# Patient Record
Sex: MALE | Race: WHITE | Employment: STUDENT | URBAN - METROPOLITAN AREA
[De-identification: names, ages, dates, MRNs, and addresses within clinical notes are randomized per-mention and may not be internally consistent; named-entity substitution may affect disease eponyms.]

---
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
## 2022-10-31 NOTE — LETTER
[facility] [employer] of Child Health Examination       Student's Name  Jaylin De Leon Title    DO                       Date  6/25/2020   Signature Grade Level/ID#  12th Grade   HEALTH HISTORY          TO BE COMPLETED AND SIGNED BY PARENT/GUARDIAN AND VERIFIED BY HEALTH CARE PROVIDER    ALLERGIES  (Food, drug, insect, other)  Patient has no known allergies.  MEDICATION  (List all prescribed or taken o PHYSICAL EXAMINATION REQUIREMENTS (head circumference if <33 years old):   /71   Pulse 66   Ht 5' 8.11\" (1.73 m)   Wt 76.9 kg (169 lb 9.6 oz)   BMI 25.70 kg/m²     DIABETES SCREENING  BMI>85% age/sex  No And any two of the following:  Family Histor Respiratory Yes                   Diagnosis of Asthma: No Mental Health Yes        Currently Prescribed Asthma Medication:            Quick-relief  medication (e.g. Short Acting Beta Antagonist): No          Controller medication (e.g. inhaled corticostero Yes

## (undated) NOTE — Clinical Note
[facility] [address] of Child Health Examination       Student's Name  Ashley Brandy Hartman Title                           Date    (If adding dates to the above immunization history section, put your initials by date(s) and sign here.)   ALTERNATIVE PROOF OF IMMUNITY   1 Diagnosis of asthma? Child wakes during the night coughing   Yes   No    Yes   No    Loss of function of one of paired organs? (eye/ear/kidney/testicle)   Yes   No      Birth Defects? Developmental delay? Yes   No    Yes   No  Hospitalizations? When? Signs of Insulin Resistance (hypertension, dyslipidemia, polycystic ovarian syndrome, acanthosis nigricans)             No              At Risk      No   Lead Risk Questionnaire  Req'd for children 6 months thru 6 yrs enrolled in licensed or public Needs/Restrictions     None   SPECIAL INSTRUCTIONS/DEVICES e.g. safety glasses, glass eye, chest protector for arrhythmia, pacemaker, prosthetic device, dental bridge, false teeth, athleticsupport/cup     None   MENTAL HEALTH/OTHER   Is there anything else

## (undated) NOTE — LETTER
Name:  Tyson Mujica Year:  10th Grade Class: Student ID No.:   Address:  [address] [place] [address],[address] [address] Phone:  [phone] (home) [phone] (work) :  15year old   Name Relationship [facility] [address] Phone Mobile Ph implanted defibrillator? 12. Has anyone in your family had unexplained fainting, seizures, or near drowning?      BONE AND JOINT QUESTIONS Yes No   17. Have you ever had an injury to a bone, muscle, ligament, or tendon that caused you to miss a practice 39.Have you ever been unable to move your arms / legs after being hit /fall? 40. Have you ever become ill while exercising in the heat?     41. Do you get frequent muscle cramps when exercising? 42.  Do you or someone in your family have sickle cell · Murmurs (auscultation standing, supine, +/- Valsalva)  · Location of point of maximal impulse (PMI) Yes    Pulses Yes    Lungs Yes    Abdomen Yes    Genitourinary (males only)* Yes    Skin:  HSV, lesions suggestive of MRSA, tinea corporis Yes    Neurolog may be asked to submit to testing for the presence of performance-enhancing substances in my/his/her body either during [employer] state series events or during the school day, and I/our student do/does hereby agree to submit to such testing and analysis by a ce

## (undated) NOTE — LETTER
VACCINE ADMINISTRATION RECORD  PARENT / GUARDIAN APPROVAL  Date: 2020  Vaccine administered to: Vero Nicole     : 2000    MRN: [id number]    A copy of the appropriate Centers for Disease Control and Prevention Vaccine Information statem

## (undated) NOTE — Clinical Note
Name:  Leana Pulido Year:  9th Grade Class: Student ID No.:   Address:  [address] [place] [address],[address] [place] [address] Phone:  [phone] (home) [phone] (work) :  17year old   Name Relationship [employer] [address] [employer] implanted defibrillator? 12. Has anyone in your family had unexplained fainting, seizures, or near drowning?      BONE AND JOINT QUESTIONS Yes No   17. Have you ever had an injury to a bone, muscle, ligament, or tendon that caused you to miss a practice 39.Have you ever been unable to move your arms / legs after being hit /fall? 40. Have you ever become ill while exercising in the heat?     41. Do you get frequent muscle cramps when exercising? 42.  Do you or someone in your family have sickle cell · Location of point of maximal impulse (PMI) Yes    Pulses Yes    Lungs Yes    Abdomen Yes    Genitourinary (males only)* Yes    Skin:  HSV, lesions suggestive of MRSA, tinea corporis Yes    Neurologic* Yes    MUSCULOSKELETAL     Neck Yes    Back Yes    Sh hereby agree to submit to such testing and analysis by a certified laboratory.  We further understand and agree that the results of the performance-enhancing substance testing may be provided to certain individuals in my/our student’s high school as specifi

## (undated) NOTE — LETTER
Name:  Leana Pulido Year:  12th Grade Class: Student ID No.:   Address:  [address] [place] [address],[address] [place] [address] Phone:  [phone] (home) [phone] (work) :  18year old   Name Relationship [address] Work [employer] implanted defibrillator? 12. Has anyone in your family had unexplained fainting, seizures, or near drowning?      BONE AND JOINT QUESTIONS Yes No   17. Have you ever had an injury to a bone, muscle, ligament, or tendon that caused you to miss a practice 39.Have you ever been unable to move your arms / legs after being hit /fall? 40. Have you ever become ill while exercising in the heat?     41. Do you get frequent muscle cramps when exercising? 42.  Do you or someone in your family have sickle cell · Location of point of maximal impulse (PMI) Yes    Pulses Yes    Lungs Yes    Abdomen Yes    Genitourinary (males only)* Yes    Skin:  HSV, lesions suggestive of MRSA, tinea corporis Yes    Neurologic* Yes    MUSCULOSKELETAL     Neck Yes    Back Yes    Sh performance-enhancing substances in my/his/her body either during [employer] state series events or during the school day, and I/our student do/does hereby agree to submit to such testing and analysis by a certified laboratory.  We further understand and agree th

## (undated) NOTE — LETTER
[facility] of Child Health Examination       Student's Name  Guera De Leon Devan Title                           Date     Signature HEALTH HISTORY          TO BE COMPLETED AND SIGNED BY PARENT/GUARDIAN AND VERIFIED BY HEALTH CARE PROVIDER    ALLERGIES  (Food, drug, insect, other) MEDICATION  (List all prescribed or taken on a regular basis.)     Diagnosis of asthma?   Child wakes during adult)   Pulse 77   Ht 5' 6\" (1.676 m)   Wt 66.7 kg (147 lb)   BMI 23.73 kg/m²     DIABETES SCREENING  BMI>85% age/sex  No And any two of the following:  Family History No   Ethnic Minority  No          Signs of Insulin Resistance (hypertension, dyslipide Currently Prescribed Asthma Medication:            Quick-relief  medication (e.g. Short Acting Beta Antagonist): No          Controller medication (e.g. inhaled corticosteroid):   No Other   NEEDS/MODIFICATIONS required in the school setting  None DIET

## (undated) NOTE — MR AVS SNAPSHOT
[address] [facility] [place]  [phone]               Thank you for choosing us for your health care visit with Giacomo Ferguson. DO [place].   We are glad to serve you and happy to provide you with this sum Caplet                   Caplet       6-11 lbs                 1.25 ml  12-17 lbs               2.5 ml  18-23 lbs 2               1 tablet  60-71 lbs                                                     2&1/2 tsp            72-95 lbs                                                     3 tsp                              3               1&1/2 table Seeks friends who share the same beliefs, values, and interests. Friends become more important. Explores romantic and sexual behaviors with others. May be influenced by peers to take risks with alcohol, tobacco, sex, or other things.   [employer] Sign Up Forms link in the Additional Information box on the right. [employer] Questions? Call [phone] for help. [employer] is NOT to be used for urgent needs. For medical emergencies, dial 911.             Educational Information     Healthy Acti o Preparing foods at home as a family  o Eating a diet rich in calcium  o Eating a high fiber diet    Help your children form healthy habits. Healthy active children are more likely to be healthy active adults!              Visit [facility]

## (undated) NOTE — LETTER
[facility] [employer] of Child Health Examination       Student's Name  Mercedes Bonner Title                           Date     Signature HEALTH HISTORY          TO BE COMPLETED AND SIGNED BY PARENT/GUARDIAN AND VERIFIED BY HEALTH CARE PROVIDER    ALLERGIES  (Food, drug, insect, other)  Patient has no known allergies.  MEDICATION  (List all prescribed or taken on a regular basis.)  No current /67   Pulse 72   Ht 5' 7.75\" (1.721 m)   Wt 81.1 kg (178 lb 12.8 oz)   BMI 27.39 kg/m²     DIABETES SCREENING  BMI>85% age/sex  Yes And any two of the following:  Family History Yes    Ethnic Minority  No          Signs of Insulin Resistance (hypert Yes        Currently Prescribed Asthma Medication:            Quick-relief  medication (e.g. Short Acting Beta Antagonist): No          Controller medication (e.g. inhaled corticosteroid):   No Other   NEEDS/MODIFICATIONS required in the school setting  No